# Patient Record
Sex: MALE | Race: BLACK OR AFRICAN AMERICAN | NOT HISPANIC OR LATINO | URBAN - METROPOLITAN AREA
[De-identification: names, ages, dates, MRNs, and addresses within clinical notes are randomized per-mention and may not be internally consistent; named-entity substitution may affect disease eponyms.]

---

## 2020-09-27 ENCOUNTER — EMERGENCY (EMERGENCY)
Facility: HOSPITAL | Age: 25
LOS: 1 days | Discharge: HOME | End: 2020-09-27
Attending: EMERGENCY MEDICINE | Admitting: EMERGENCY MEDICINE
Payer: COMMERCIAL

## 2020-09-27 VITALS
DIASTOLIC BLOOD PRESSURE: 74 MMHG | OXYGEN SATURATION: 98 % | HEART RATE: 70 BPM | SYSTOLIC BLOOD PRESSURE: 123 MMHG | RESPIRATION RATE: 15 BRPM | WEIGHT: 179.9 LBS | TEMPERATURE: 98 F | HEIGHT: 70 IN

## 2020-09-27 DIAGNOSIS — M54.2 CERVICALGIA: ICD-10-CM

## 2020-09-27 PROCEDURE — 99283 EMERGENCY DEPT VISIT LOW MDM: CPT

## 2020-09-27 PROCEDURE — 99282 EMERGENCY DEPT VISIT SF MDM: CPT

## 2020-09-27 NOTE — ED ADULT NURSE NOTE - CHPI ED NUR SYMPTOMS NEG
no loss of consciousness/no sleeping issues/no disorientation/no crying/no dizziness/no headache/no laceration/no acting out behaviors/no back pain/no bruising/no decreased eating/drinking/no fussiness

## 2020-09-27 NOTE — ED PROVIDER NOTE - NSFOLLOWUPINSTRUCTIONS_ED_ALL_ED_FT
Follow up with your primary physician for a post hospital visit within 48 hours, taking all results from the ER to be reviewed.   Apply a heating pad as directed to help relax the muscles. In addition if needed you can take Motrin 600 mg 1 tab every 8 hours. If any weakness to the upper extremities, any worsening, concerning  or new signs or symptoms  return to the ER Follow up with your primary physician for a post hospital visit within 48 hours, taking all results from the ER to be reviewed.   Apply a heating pad as directed to help relax the muscles. In addition if needed for pain you can take Motrin 600 mg 1 tab every 8 hours. If any weakness to the upper extremities, any worsening, concerning  or new signs or symptoms  return to the ER

## 2020-09-27 NOTE — ED PROVIDER NOTE - PATIENT PORTAL LINK FT
You can access the FollowMyHealth Patient Portal offered by [facility] by registering at the following website: [email or web address]. By joining [employer]’s FollowMyHealth portal, you will also be able to view your health information using other applications (apps) compatible with our system.
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
## 2020-12-16 ENCOUNTER — EMERGENCY (EMERGENCY)
Facility: HOSPITAL | Age: 25
LOS: 1 days | Discharge: HOME | End: 2020-12-16
Attending: INTERNAL MEDICINE | Admitting: INTERNAL MEDICINE
Payer: MEDICARE

## 2020-12-16 VITALS
HEIGHT: 70 IN | WEIGHT: 179.9 LBS | OXYGEN SATURATION: 99 % | HEART RATE: 82 BPM | SYSTOLIC BLOOD PRESSURE: 116 MMHG | RESPIRATION RATE: 16 BRPM | DIASTOLIC BLOOD PRESSURE: 76 MMHG | TEMPERATURE: 98 F

## 2020-12-16 LAB — SARS-COV-2 RNA SPEC QL NAA+PROBE: SIGNIFICANT CHANGE UP

## 2020-12-16 PROCEDURE — U0003: CPT

## 2020-12-16 PROCEDURE — 99283 EMERGENCY DEPT VISIT LOW MDM: CPT

## 2020-12-16 NOTE — ED PROVIDER NOTE - PATIENT PORTAL LINK FT
You can access the FollowMyHealth Patient Portal offered by [facility] by registering at the following website: [email or web address]. By joining [employer]’s FollowMyHealth portal, you will also be able to view your health information using other applications (apps) compatible with our system.

## 2021-03-29 ENCOUNTER — EMERGENCY (EMERGENCY)
Facility: HOSPITAL | Age: 26
LOS: 1 days | Discharge: HOME | End: 2021-03-29
Attending: EMERGENCY MEDICINE | Admitting: EMERGENCY MEDICINE
Payer: COMMERCIAL

## 2021-03-29 VITALS
WEIGHT: 179.9 LBS | HEART RATE: 77 BPM | SYSTOLIC BLOOD PRESSURE: 137 MMHG | TEMPERATURE: 98 F | OXYGEN SATURATION: 97 % | DIASTOLIC BLOOD PRESSURE: 93 MMHG | HEIGHT: 70 IN | RESPIRATION RATE: 16 BRPM

## 2021-03-29 PROCEDURE — 99282 EMERGENCY DEPT VISIT SF MDM: CPT

## 2021-03-29 PROCEDURE — 99284 EMERGENCY DEPT VISIT MOD MDM: CPT

## 2021-03-29 NOTE — ED PROVIDER NOTE - PATIENT PORTAL LINK FT
You can access the FollowMyHealth Patient Portal offered by [facility] by registering at the following website: [email or web address]. By joining [employer]’s FollowMyHealth portal, you will also be able to view your health information using other applications (apps) compatible with our system.

## 2021-03-29 NOTE — ED PROVIDER NOTE - NSFOLLOWUPINSTRUCTIONS_ED_ALL_ED_FT
ANIMAL BITE - AfterCare(R) Instructions(ER/ED)           Animal Bite    WHAT YOU NEED TO KNOW:    Animal bite injuries range from shallow cuts to deep, life-threatening wounds. An animal can cut or puncture the skin when it bites. Your skin may be torn from your body. Your skin may swell or bruise even if the bite does not break the skin. Animal bites occur more often on the hands, arms, legs, and face. Bites from dogs and cats are the most common injuries.    DISCHARGE INSTRUCTIONS:    Return to the emergency department if:   •You have a fever.      •Your wound is red, swollen, and draining pus.      •You see red streaks on the skin around the wound.      •You can no longer move the bitten area.      •Your heartbeat and breathing are much faster than usual.      •You feel dizzy and confused.      Contact your healthcare provider if:   •Your pain does not get better, even after you take pain medicine.      •You have nightmares or flashbacks about the animal bite.       •You have questions or concerns about your condition or care.      Medicines: You may need any of the following:   •Antibiotics prevent or treat a bacterial infection.      •Prescription pain medicine may be given. Ask how to take this medicine safely.      •A tetanus vaccine may be needed to prevent tetanus. Tetanus is a life-threatening bacterial infection that affects the nerves and muscles. The bacteria can be spread through animal bites.       •A rabies vaccine may be needed to prevent rabies. Rabies is a life-threatening viral infection. The virus can be spread through animal bites.      •Take your medicine as directed. Contact your healthcare provider if you think your medicine is not helping or if you have side effects. Tell him of her if you are allergic to any medicine. Keep a list of the medicines, vitamins, and herbs you take. Include the amounts, and when and why you take them. Bring the list or the pill bottles to follow-up visits. Carry your medicine list with you in case of an emergency.      Follow up with your healthcare provider in 1 to 2 days: You may need to return to have your stitches removed. Write down your questions so you remember to ask them during your visits.    Self-care:   •Apply antibiotic ointment as directed. This helps prevent infection in minor skin wounds. It is available without a doctor's order.      •Keep the wound clean and covered. Wash the wound every day with soap and water or germ-killing cleanser. Ask your healthcare provider about the kinds of bandages to use.       •Apply ice on your wound. Ice helps decrease swelling and pain. Ice may also help prevent tissue damage. Use an ice pack, or put crushed ice in a plastic bag. Cover it with a towel and place it on your wound for 15 to 20 minutes every hour or as directed.      •Elevate the wound area. Raise your wound above the level of your heart as often as you can. This will help decrease swelling and pain. Prop your wound on pillows or blankets to keep it elevated comfortably.       Prevent another animal bite:   •Learn to recognize the signs of a scared or angry pet. Avoid quick, sudden movements.      •Do not step between animals that are fighting.      •Do not leave a pet alone with a young child.      •Do not disturb an animal while it eats, sleeps, or cares for its young.      •Do not approach an animal you do not know, especially one that is tied up or caged.      •Stay away from animals that seem sick or act strangely.      •Do not feed or capture wild animals.          © Copyright [employer] 2021           back to top                          © Copyright [employer] 2021

## 2021-03-29 NOTE — ED ADULT NURSE NOTE - OBJECTIVE STATEMENT
Patient presents to ED with complaints of dog bite while in [place] on his way to work. Patient was bit on 2nd and 4th digits of left hand. Bite cleaned with peroxide and betadine antiseptic. Patient denies pain/discomfort at this time. Patient does not want to be treated with rabies vaccination/immunoglobulin at this time. Patient denies other injuries or complaints at this time. Patient states animal appeared well, unknown if animal immunizations are UTD.

## 2021-03-29 NOTE — ED PROVIDER NOTE - CLINICAL SUMMARY MEDICAL DECISION MAKING FREE TEXT BOX
Patient bitten by dog . sustained lacerations to lateral aspects of both 2nd and 4th fingers. Has been soaked with peroxide/betadine. Augmentin ordered.  Refused rabies. Is going to ask neighbor regarding rabies status

## 2021-12-20 ENCOUNTER — EMERGENCY (EMERGENCY)
Facility: HOSPITAL | Age: 26
LOS: 1 days | Discharge: HOME | End: 2021-12-20
Attending: EMERGENCY MEDICINE | Admitting: EMERGENCY MEDICINE
Payer: COMMERCIAL

## 2021-12-20 VITALS
HEART RATE: 92 BPM | RESPIRATION RATE: 16 BRPM | WEIGHT: 190.04 LBS | TEMPERATURE: 100 F | DIASTOLIC BLOOD PRESSURE: 81 MMHG | OXYGEN SATURATION: 99 % | HEIGHT: 70 IN | SYSTOLIC BLOOD PRESSURE: 130 MMHG
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
## 2021-12-20 DIAGNOSIS — S61.211A LACERATION WITHOUT FOREIGN BODY OF LEFT INDEX FINGER WITHOUT DAMAGE TO NAIL, INITIAL ENCOUNTER: ICD-10-CM

## 2021-12-20 DIAGNOSIS — S61.215A LACERATION WITHOUT FOREIGN BODY OF LEFT RING FINGER WITHOUT DAMAGE TO NAIL, INITIAL ENCOUNTER: ICD-10-CM

## 2021-12-20 DIAGNOSIS — S61.251A OPEN BITE OF LEFT INDEX FINGER WITHOUT DAMAGE TO NAIL, INITIAL ENCOUNTER: ICD-10-CM
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
## 2021-12-20 DIAGNOSIS — S61.255A OPEN BITE OF LEFT RING FINGER WITHOUT DAMAGE TO NAIL, INITIAL ENCOUNTER: ICD-10-CM

## 2021-12-20 LAB — SARS-COV-2 RNA SPEC QL NAA+PROBE: DETECTED

## 2021-12-20 PROCEDURE — 99283 EMERGENCY DEPT VISIT LOW MDM: CPT

## 2021-12-20 PROCEDURE — 99284 EMERGENCY DEPT VISIT MOD MDM: CPT

## 2021-12-20 PROCEDURE — U0005: CPT

## 2021-12-20 PROCEDURE — U0003: CPT

## 2021-12-20 NOTE — ED PROVIDER NOTE - CLINICAL SUMMARY MEDICAL DECISION MAKING FREE TEXT BOX
25 yo male with no medical history, complaining of 2 days of malaise, generalized aches, Temp of 99.7 at home, intermittent sore throat, HA (resolved prior to presentation in ED). Patient denies any cough, SOB, chest pain, rashes, or any other complaint. Patient requesting COVID19 testing.    No exudates in oropharnyx, no cervical lymphadenopathy, Lungs clear to ascultation b/l all fields.     COVID19 PCR ordered.

## 2021-12-20 NOTE — ED PROVIDER NOTE - OBJECTIVE STATEMENT
malaise, generalized aches, 99.7, sore throat, HA (resolved) 25 yo male with no medical history, complaining of 2 days of malaise, generalized aches, Temp of 99.7 at home, intermittent sore throat, HA (resolved prior to presentation in ED). Patient denies any cough, SOB, chest pain, rashes, or any other complaint.
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
## 2021-12-20 NOTE — ED PROVIDER NOTE - PATIENT PORTAL LINK FT
You can access the FollowMyHealth Patient Portal offered by [facility] by registering at the following website: [email or web address]. By joining [employer]’s FollowMyHealth portal, you will also be able to view your health information using other applications (apps) compatible with our system.

## 2021-12-20 NOTE — ED PROVIDER NOTE - NSFOLLOWUPINSTRUCTIONS_ED_ALL_ED_FT
1You were seen in the ED and underwent testing for the novel coronavirus (COVID-19). The results are not back yet and you will be contacted.      Viral Syndrome    WHAT YOU NEED TO KNOW:    Viral syndrome is a term used for symptoms of an infection caused by a virus. Viruses are spread easily from person to person through the air and on shared items.    DISCHARGE INSTRUCTIONS:    Call your local emergency number (911 in the [place]) or have someone else call if:   •You have a seizure.      •You cannot be woken.      •You have chest pain or trouble breathing.      Return to the emergency department if:   •You have a stiff neck, a bad headache, and sensitivity to light.      •You feel weak, dizzy, or confused.      •You stop urinating or urinate a lot less than usual.      •You cough up blood or thick yellow or green mucus.      •You have severe abdominal pain or your abdomen is larger than usual.      Call your doctor if:   •Your symptoms do not get better with treatment or get worse after 3 days.      •You have a rash or ear pain.      •You have burning when you urinate.      •You have questions or concerns about your condition or care.      Medicines: You may need any of the following:   •Acetaminophen decreases pain and fever. It is available without a doctor's order. Ask how much to take and how often to take it. Follow directions. Read the labels of all other medicines you are using to see if they also contain acetaminophen, or ask your doctor or pharmacist. Acetaminophen can cause liver damage if not taken correctly. Do not use more than 4 grams (4,000 milligrams) total of acetaminophen in one day.       •NSAIDs, such as ibuprofen, help decrease swelling, pain, and fever. NSAIDs can cause stomach bleeding or kidney problems in certain people. If you take blood thinner medicine, always ask your healthcare provider if NSAIDs are safe for you. Always read the medicine label and follow directions.      •Cold medicine helps decrease swelling, control a cough, and relieve chest or nasal congestion.       •Saline nasal spray helps decrease nasal congestion.       •Take your medicine as directed. Contact your healthcare provider if you think your medicine is not helping or if you have side effects. Tell him of her if you are allergic to any medicine. Keep a list of the medicines, vitamins, and herbs you take. Include the amounts, and when and why you take them. Bring the list or the pill bottles to follow-up visits. Carry your medicine list with you in case of an emergency.      Manage your symptoms:   •Drink liquids as directed to prevent dehydration. Ask how much liquid to drink each day and which liquids are best for you. Ask if you should drink an oral rehydration solution (ORS). An ORS has the right amounts of water, salts, and sugar you need to replace body fluids. This may help prevent dehydration caused by vomiting or diarrhea. Do not drink liquids with caffeine. Liquids with caffeine can make dehydration worse.      •Get plenty of rest to help your body heal. Take naps throughout the day. Ask your healthcare provider when you can return to work and your normal activities.      •Use a cool mist humidifier to help you breathe easier. Ask your healthcare provider how to use a cool mist humidifier.      •Eat honey or use cough drops for a sore throat. Cough drops are available without a doctor's order. Follow directions for taking cough drops.      •Do not smoke or be close to anyone who is smoking. Nicotine and other chemicals in cigarettes and cigars can cause lung damage. Smoking can also delay healing. Ask your healthcare provider for information if you currently smoke and need help to quit. E-cigarettes or smokeless tobacco still contain nicotine. Talk to your healthcare provider before you use these products.      Prevent the spread of germs:          •Wash your hands often. Wash your hands several times each day. Wash after you use the bathroom, change a child's diaper, and before you prepare or eat food. Use soap and water every time. Rub your soapy hands together, lacing your fingers. Wash the front and back of your hands, and in between your fingers. Use the fingers of one hand to scrub under the fingernails of the other hand. Wash for at least 20 seconds. Rinse with warm, running water for several seconds. Then dry your hands with a clean towel or paper towel. Use hand  that contains alcohol if soap and water are not available. Do not touch your eyes, nose, or mouth without washing your hands first.  Handwashing           •Cover a sneeze or cough. Use a tissue that covers your mouth and nose. Throw the tissue away in a trash can right away. Use the bend of your arm if a tissue is not available. Wash your hands well with soap and water or use a hand .      •Stay away from others while you are sick. Avoid crowds as much as possible.      •Ask about vaccines you may need. Talk to your healthcare provider about your vaccine history. He or she will tell you which vaccines you need, and when to get them.?Get the influenza (flu) vaccine as soon as recommended each year. The flu vaccine is available starting in September or October. Flu viruses change, so it is important to get a flu vaccine every year.      ?Get the pneumonia vaccine if recommended. This vaccine is usually recommended every 5 years. Your provider will tell you when to get this vaccine, if needed.        Follow up with your doctor as directed: Write down your questions so you remember to ask them during your visits.

## 2021-12-20 NOTE — ED PROVIDER NOTE - ENMT, MLM
Airway patent, Nasal mucosa clear. Mouth with normal mucosa. Throat has no vesicles, no oropharyngeal exudates and uvula is midline. TMs normal

## 2021-12-23 NOTE — ED PROVIDER NOTE - NEURO NEGATIVE STATEMENT, MLM
CBC/CMP
no loss of consciousness, no gait abnormality, no headache, no sensory deficits, and no weakness.

## 2022-02-15 ENCOUNTER — NON-APPOINTMENT (OUTPATIENT)
Age: 27
End: 2022-02-15

## 2022-02-15 ENCOUNTER — APPOINTMENT (OUTPATIENT)
Dept: ORTHOPEDIC SURGERY | Facility: CLINIC | Age: 27
End: 2022-02-15
Payer: COMMERCIAL

## 2022-02-15 PROBLEM — Z00.00 ENCOUNTER FOR PREVENTIVE HEALTH EXAMINATION: Status: ACTIVE | Noted: 2022-02-15

## 2022-02-15 PROCEDURE — 99203 OFFICE O/P NEW LOW 30 MIN: CPT

## 2022-03-01 ENCOUNTER — APPOINTMENT (OUTPATIENT)
Dept: ORTHOPEDIC SURGERY | Facility: CLINIC | Age: 27
End: 2022-03-01
Payer: COMMERCIAL

## 2022-03-01 PROCEDURE — 99214 OFFICE O/P EST MOD 30 MIN: CPT

## 2022-03-01 NOTE — DISCUSSION/SUMMARY
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 We discussed treatment options at length both surgical and nonsurgical. We discussed nonsurgical treatment with activity modification and physical therapy with risk of further instability episodes of further damage to cartilage and meniscus. We discussed surgical treatment. Right knee arthroscopy, anterior cruciate ligament reconstruction, partial lateral meniscectomy versus repair, and patella chondroplasty. Given his age and activity level I would recommend patella tendon autograft. Risks benefits alternatives discussed including but not limited to risks such as bleeding infection nerve and vessel injury continued pain stiffness need for future surgery medical complications such as DVT or PE and anesthesia complications. All questions were answered and he will schedule at his convenience

## 2022-03-01 NOTE — HISTORY OF PRESENT ILLNESS
[de-identified] : 26-year-old male presents complaining of left knee pain for 2 weeks. His playing basketball he sustained an injury for the pop in his knee with immediate swelling. He reports knee instability. He was evaluated by Dr. Bravo who sent him for an MRI at [facility]. He states today for further evaluation \par

## 2022-03-01 NOTE — PHYSICAL EXAM
[de-identified] : left knee exam\par \par Skin: Clean, dry, intact\par Inspection: No obvious malalignment, no masses, + swelling, + effusion.\par Tenderness: no MJLT. ++ LJLT. No tenderness over the medial and lateral patella facets. No ttp medial/lateral epicondyle, patella tendon, tibial tubercle, pes anserinus, or proximal fibula.\par ROM: 0 to 130° no pain with deep flexion in both knees\par Stability: Stable to varus, valgus, laxity with lachman testing. +anterior, neg posterior drawer.\par Additional tests: Negative McMurrays test, Negative patellar grind test. \par Strength: 4/5 Q/H/TA/GS/EHL, no atrophy\par Neuro: In tact to light touch throughout in dp/sp/tib/viv/saph nerve districutions, DTR's normal\par Pulses: 2+ DP/PT pulses. [de-identified] : Radiographs and MRI obtained previously were reviewed. Joint spaces are well-maintained. There is a full-thickness tear of the anterior cruciate ligament. There is a bucket-handle tear of the lateral meniscus. There is chondral loss in the lateral patella facet and a large effusion

## 2022-03-16 ENCOUNTER — OUTPATIENT (OUTPATIENT)
Dept: OUTPATIENT SERVICES | Facility: HOSPITAL | Age: 27
LOS: 1 days | Discharge: HOME | End: 2022-03-16

## 2022-03-16 VITALS
SYSTOLIC BLOOD PRESSURE: 120 MMHG | TEMPERATURE: 98 F | DIASTOLIC BLOOD PRESSURE: 73 MMHG | HEIGHT: 65 IN | HEART RATE: 62 BPM | RESPIRATION RATE: 18 BRPM | WEIGHT: 182.54 LBS | OXYGEN SATURATION: 100 %

## 2022-03-16 DIAGNOSIS — Z01.812 ENCOUNTER FOR PREPROCEDURAL LABORATORY EXAMINATION: ICD-10-CM

## 2022-03-16 DIAGNOSIS — S83.511A SPRAIN OF ANTERIOR CRUCIATE LIGAMENT OF RIGHT KNEE, INITIAL ENCOUNTER: ICD-10-CM

## 2022-03-16 DIAGNOSIS — Z01.818 ENCOUNTER FOR OTHER PREPROCEDURAL EXAMINATION: ICD-10-CM

## 2022-03-16 LAB
ANION GAP SERPL CALC-SCNC: 4 MMOL/L — LOW (ref 5–17)
BUN SERPL-MCNC: 14 MG/DL — SIGNIFICANT CHANGE UP (ref 7–23)
CALCIUM SERPL-MCNC: 9.3 MG/DL — SIGNIFICANT CHANGE UP (ref 8.5–10.1)
CHLORIDE SERPL-SCNC: 104 MMOL/L — SIGNIFICANT CHANGE UP (ref 96–108)
CO2 SERPL-SCNC: 30 MMOL/L — SIGNIFICANT CHANGE UP (ref 22–31)
CREAT SERPL-MCNC: 0.99 MG/DL — SIGNIFICANT CHANGE UP (ref 0.5–1.3)
EGFR: 106 ML/MIN/1.73M2 — SIGNIFICANT CHANGE UP
GLUCOSE SERPL-MCNC: 90 MG/DL — SIGNIFICANT CHANGE UP (ref 70–99)
HCT VFR BLD CALC: 42.5 % — SIGNIFICANT CHANGE UP (ref 39–50)
HGB BLD-MCNC: 14 G/DL — SIGNIFICANT CHANGE UP (ref 13–17)
MCHC RBC-ENTMCNC: 27.8 PG — SIGNIFICANT CHANGE UP (ref 27–34)
MCHC RBC-ENTMCNC: 32.9 G/DL — SIGNIFICANT CHANGE UP (ref 32–36)
MCV RBC AUTO: 84.3 FL — SIGNIFICANT CHANGE UP (ref 80–100)
NRBC # BLD: 0 /100 WBCS — SIGNIFICANT CHANGE UP (ref 0–0)
PLATELET # BLD AUTO: 260 K/UL — SIGNIFICANT CHANGE UP (ref 150–400)
POTASSIUM SERPL-MCNC: 3.7 MMOL/L — SIGNIFICANT CHANGE UP (ref 3.5–5.3)
POTASSIUM SERPL-SCNC: 3.7 MMOL/L — SIGNIFICANT CHANGE UP (ref 3.5–5.3)
RBC # BLD: 5.04 M/UL — SIGNIFICANT CHANGE UP (ref 4.2–5.8)
RBC # FLD: 15.1 % — HIGH (ref 10.3–14.5)
SODIUM SERPL-SCNC: 138 MMOL/L — SIGNIFICANT CHANGE UP (ref 135–145)
WBC # BLD: 6.24 K/UL — SIGNIFICANT CHANGE UP (ref 3.8–10.5)
WBC # FLD AUTO: 6.24 K/UL — SIGNIFICANT CHANGE UP (ref 3.8–10.5)

## 2022-03-16 NOTE — H&P PST ADULT - NSICDXPASTMEDICALHX_GEN_ALL_CORE_FT
PAST MEDICAL HISTORY:  2019 novel coronavirus disease (COVID-19) 12/2021 + covid, home quarantine, no intubation, no hospitalization     PAST MEDICAL HISTORY:  2019 novel coronavirus disease (COVID-19) 12/20/2021 + covid, home quarantine, no intubation, no hospitalization

## 2022-03-16 NOTE — H&P PST ADULT - PROBLEM SELECTOR PLAN 1
right knee arthroscopy, anterior cruciate ligament reconstruction, partial lateral meniscectomy versus repair, and patella chondroplasty

## 2022-03-16 NOTE — H&P PST ADULT - HISTORY OF PRESENT ILLNESS
This is a  26 y/o male twisted right knee in a basketball game 2/2022, c/o right knee pain, seen orthopedist, recommended surgery, He presents today for right knee arthroscopy ACL reconstruction, meniscectomy repair  and patella chondroplasty 3/21/22  covid swab to be done 3/18 at [place]  denies recent travel, sick contact or covid infections

## 2022-03-16 NOTE — H&P PST ADULT - NSANTHOSAYNRD_GEN_A_CORE
No. EKTA screening performed.  STOP BANG Legend: 0-2 = LOW Risk; 3-4 = INTERMEDIATE Risk; 5-8 = HIGH Risk

## 2022-03-20 ENCOUNTER — TRANSCRIPTION ENCOUNTER (OUTPATIENT)
Age: 27
End: 2022-03-20

## 2022-03-21 ENCOUNTER — APPOINTMENT (OUTPATIENT)
Dept: ORTHOPEDIC SURGERY | Facility: HOSPITAL | Age: 27
End: 2022-03-21

## 2022-03-21 ENCOUNTER — OUTPATIENT (OUTPATIENT)
Dept: OUTPATIENT SERVICES | Facility: HOSPITAL | Age: 27
LOS: 1 days | Discharge: HOME | End: 2022-03-21
Payer: COMMERCIAL

## 2022-03-21 VITALS
OXYGEN SATURATION: 99 % | RESPIRATION RATE: 16 BRPM | TEMPERATURE: 98 F | SYSTOLIC BLOOD PRESSURE: 147 MMHG | HEART RATE: 81 BPM | DIASTOLIC BLOOD PRESSURE: 88 MMHG

## 2022-03-21 VITALS
RESPIRATION RATE: 16 BRPM | DIASTOLIC BLOOD PRESSURE: 75 MMHG | HEART RATE: 64 BPM | SYSTOLIC BLOOD PRESSURE: 114 MMHG | TEMPERATURE: 98 F | WEIGHT: 179.9 LBS | HEIGHT: 70 IN | OXYGEN SATURATION: 99 %

## 2022-03-21 PROCEDURE — 29888 ARTHRS AID ACL RPR/AGMNTJ: CPT | Mod: RT

## 2022-03-21 PROCEDURE — 29881 ARTHRS KNE SRG MNISECTMY M/L: CPT | Mod: RT

## 2022-03-21 DEVICE — IMPLANTABLE DEVICE: Type: IMPLANTABLE DEVICE | Site: RIGHT | Status: FUNCTIONAL

## 2022-03-21 DEVICE — SCREW INTRFRNCE SHEATHED 8X20MM: Type: IMPLANTABLE DEVICE | Site: RIGHT | Status: FUNCTIONAL

## 2022-03-21 DEVICE — ANCHOR SUT FIBERSTITCH 2-0 CRVD: Type: IMPLANTABLE DEVICE | Site: RIGHT | Status: FUNCTIONAL

## 2022-03-21 RX ORDER — ONDANSETRON 8 MG/1
1 TABLET, FILM COATED ORAL
Qty: 21 | Refills: 0
Start: 2022-03-21 | End: 2022-03-27

## 2022-03-21 RX ORDER — OXYCODONE HYDROCHLORIDE 5 MG/1
1 TABLET ORAL
Qty: 28 | Refills: 0
Start: 2022-03-21 | End: 2022-03-27

## 2022-03-21 RX ORDER — ONDANSETRON 8 MG/1
4 TABLET, FILM COATED ORAL ONCE
Refills: 0 | Status: DISCONTINUED | OUTPATIENT
Start: 2022-03-21 | End: 2022-03-21

## 2022-03-21 RX ORDER — FENTANYL CITRATE 50 UG/ML
25 INJECTION INTRAVENOUS
Refills: 0 | Status: DISCONTINUED | OUTPATIENT
Start: 2022-03-21 | End: 2022-03-21

## 2022-03-21 RX ORDER — SODIUM CHLORIDE 9 MG/ML
1000 INJECTION, SOLUTION INTRAVENOUS
Refills: 0 | Status: DISCONTINUED | OUTPATIENT
Start: 2022-03-21 | End: 2022-03-21

## 2022-03-21 RX ORDER — ASPIRIN/CALCIUM CARB/MAGNESIUM 324 MG
1 TABLET ORAL
Qty: 14 | Refills: 0
Start: 2022-03-21 | End: 2022-04-03

## 2022-03-21 RX ORDER — DOCUSATE SODIUM 100 MG
1 CAPSULE ORAL
Qty: 21 | Refills: 0
Start: 2022-03-21 | End: 2022-03-27

## 2022-03-21 RX ORDER — FENTANYL CITRATE 50 UG/ML
50 INJECTION INTRAVENOUS
Refills: 0 | Status: DISCONTINUED | OUTPATIENT
Start: 2022-03-21 | End: 2022-03-21

## 2022-03-21 RX ORDER — SODIUM CHLORIDE 9 MG/ML
1000 INJECTION, SOLUTION INTRAVENOUS
Refills: 0 | Status: DISCONTINUED | OUTPATIENT
Start: 2022-03-21 | End: 2022-04-04

## 2022-03-21 RX ORDER — SODIUM CHLORIDE 9 MG/ML
3 INJECTION INTRAMUSCULAR; INTRAVENOUS; SUBCUTANEOUS EVERY 8 HOURS
Refills: 0 | Status: DISCONTINUED | OUTPATIENT
Start: 2022-03-21 | End: 2022-03-21

## 2022-03-21 RX ADMIN — FENTANYL CITRATE 50 MICROGRAM(S): 50 INJECTION INTRAVENOUS at 12:03

## 2022-03-21 RX ADMIN — SODIUM CHLORIDE 75 MILLILITER(S): 9 INJECTION, SOLUTION INTRAVENOUS at 12:04

## 2022-03-21 RX ADMIN — FENTANYL CITRATE 50 MICROGRAM(S): 50 INJECTION INTRAVENOUS at 12:19

## 2022-03-21 RX ADMIN — FENTANYL CITRATE 50 MICROGRAM(S): 50 INJECTION INTRAVENOUS at 12:23

## 2022-03-21 RX ADMIN — FENTANYL CITRATE 50 MICROGRAM(S): 50 INJECTION INTRAVENOUS at 13:07

## 2022-03-21 RX ADMIN — FENTANYL CITRATE 50 MICROGRAM(S): 50 INJECTION INTRAVENOUS at 12:49

## 2022-03-21 NOTE — ASU DISCHARGE PLAN (ADULT/PEDIATRIC) - CARE PROVIDER_API CALL
Rich Tse)  Orthopaedic Surgery  [address], [address]  [place] [address]  Phone: [phone]  Fax: [phone]  Follow Up Time:

## 2022-03-21 NOTE — BRIEF OPERATIVE NOTE - OPERATION/FINDINGS
right knee arthroscopic ACL reconstruction with BTB autograft and partial lateral menisectomy of chronic buckle handle tear

## 2022-03-21 NOTE — ASU PATIENT PROFILE, ADULT - FALL HARM RISK - UNIVERSAL INTERVENTIONS
Bed in lowest position, wheels locked, appropriate side rails in place/Call bell, personal items and telephone in reach/Instruct patient to call for assistance before getting out of bed or chair/Non-slip footwear when patient is out of bed/[place] to call system/Physically safe environment - no spills, clutter or unnecessary equipment/Purposeful Proactive Rounding/Room/bathroom lighting operational, light cord in reach

## 2022-03-21 NOTE — ASU PATIENT PROFILE, ADULT - NSICDXPASTMEDICALHX_GEN_ALL_CORE_FT
PAST MEDICAL HISTORY:  2019 novel coronavirus disease (COVID-19) 12/20/2021 + covid, home quarantine, no intubation, no hospitalization

## 2022-03-21 NOTE — ASU DISCHARGE PLAN (ADULT/PEDIATRIC) - NS MD DC FALL RISK RISK
For information on Fall & Injury Prevention, visit: [email or web address] OR  [email or web address] OR  https://www.cdc.gov/steadi/patient.html

## 2022-03-24 DIAGNOSIS — S83.251A BUCKET-HANDLE TEAR OF LATERAL MENISCUS, CURRENT INJURY, RIGHT KNEE, INITIAL ENCOUNTER: ICD-10-CM
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
## 2022-03-24 DIAGNOSIS — S83.511A SPRAIN OF ANTERIOR CRUCIATE LIGAMENT OF RIGHT KNEE, INITIAL ENCOUNTER: ICD-10-CM
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
## 2022-03-24 DIAGNOSIS — Z86.16 PERSONAL HISTORY OF COVID-19: ICD-10-CM
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
## 2022-03-24 RX ORDER — TRAMADOL HYDROCHLORIDE 50 MG/1
50 TABLET, COATED ORAL
Qty: 60 | Refills: 0 | Status: ACTIVE | COMMUNITY
Start: 2022-03-24 | End: 1900-01-01

## 2022-03-24 RX ORDER — IBUPROFEN 800 MG/1
800 TABLET, FILM COATED ORAL 3 TIMES DAILY
Qty: 60 | Refills: 1 | Status: ACTIVE | COMMUNITY
Start: 2022-03-24 | End: 1900-01-01

## 2022-04-05 ENCOUNTER — APPOINTMENT (OUTPATIENT)
Dept: ORTHOPEDIC SURGERY | Facility: CLINIC | Age: 27
End: 2022-04-05
Payer: COMMERCIAL

## 2022-04-05 PROCEDURE — 99024 POSTOP FOLLOW-UP VISIT: CPT

## 2022-04-05 NOTE — HISTORY OF PRESENT ILLNESS
[de-identified] : R knee [de-identified] : 28yo M s/p Right knee arthroscopy, anterior cruciate ligament reconstruction with bone-patellar tendon-bone autograft and partial lateral meniscectomy, 3/21/22.  He is doing well.  His pain is controlled.  He has started physical therapy. before [de-identified] : Right knee exam\par Incisions are healing well no erythema\par Mild effusion\par Range of motion 0-°100\par Stable anterior drawer and Lachman examination\par calf is soft and nontender\par Able to flex and extend all toes\par Sensation intact throughout\par brisk capillary refill.\par  [de-identified] : 28yo M s/p Right knee arthroscopy, anterior cruciate ligament reconstruction with bone-patellar tendon-bone autograft and partial lateral meniscectomy, 3/21/22. [de-identified] : We reviewed postoperative protocol and restrictions.  Brace unlocked 0 to 90 degrees weightbearing as tolerated in the brace.  Physical therapy.  Follow-up 1 month

## 2022-05-03 PROBLEM — U07.1 COVID-19: Chronic | Status: ACTIVE | Noted: 2022-03-16

## 2022-05-11 ENCOUNTER — APPOINTMENT (OUTPATIENT)
Dept: ORTHOPEDIC SURGERY | Facility: CLINIC | Age: 27
End: 2022-05-11
Payer: COMMERCIAL

## 2022-05-11 PROCEDURE — 99024 POSTOP FOLLOW-UP VISIT: CPT

## 2022-05-11 NOTE — HISTORY OF PRESENT ILLNESS
[de-identified] : R knee [de-identified] : 28yo M s/p Right knee arthroscopy, anterior cruciate ligament reconstruction with bone-patellar tendon-bone autograft and partial lateral meniscectomy, 3/21/22.  He is overall doing well.  His pain is controlled.  His brace was discontinued by his physical therapist was happy with his progress [de-identified] : Right knee exam\par Incisions are healing well no erythema\par Mild effusion\par Range of motion 0-°140\par no Medial joint line tenderness.  No lateral joint line tenderness.  Stable anterior drawer and Lachman examination\par calf is soft and nontender\par Able to flex and extend all toes\par Sensation intact throughout\par brisk capillary refill.\par  [de-identified] : 28yo M s/p Right knee arthroscopy, anterior cruciate ligament reconstruction with bone-patellar tendon-bone autograft and partial lateral meniscectomy, 3/21/22. [de-identified] : We reviewed postoperative protocol and restrictions.  Weightbearing as tolerated continue physical therapy focus on quadriceps and core strengthening.  He will follow-up in 6 weeks.

## 2022-06-22 ENCOUNTER — APPOINTMENT (OUTPATIENT)
Dept: ORTHOPEDIC SURGERY | Facility: CLINIC | Age: 27
End: 2022-06-22
Payer: COMMERCIAL

## 2022-06-22 VITALS — WEIGHT: 175 LBS | HEIGHT: 70 IN | BODY MASS INDEX: 25.05 KG/M2

## 2022-06-22 PROCEDURE — 99213 OFFICE O/P EST LOW 20 MIN: CPT

## 2022-06-22 NOTE — PHYSICAL EXAM
[de-identified] : Right hip exam\par \par Skin: Clean/dry and intact\par Inspection: No obvious deformity, no swelling, no ecchymosis.\par Tenderness:  no tenderness over greater trochanter/glut medius insertion. No tenderness pubic symphysis, pubic tubercle, hip flexors. No ttp ischial tuberosity or buttock. No ttp over the ASIS/Illiac crest.\par ROM: 0-120°. Internal rotation 30 external rotation 70\par Painful ROM: None\par Additional tests: No pain with circumduction negative impingement test at 90° mildly positive impingement test at 60° negative Guido negative [facility]\par Strength: 5/5 hip flexion/ADD/ABD/Q/H/TA/GS/EHL\par Neuro: Sensation in tact to light touch throughout in dp/sp/tib/viv/saph distributions\par Pulses: 2+ DP/PT pulses\par

## 2022-06-22 NOTE — DISCUSSION/SUMMARY
[de-identified] : We reviewed postoperative protocol and restrictions.  He will continue his physical therapy.  He will begin straight line jogging at this point.  We will focus on quadriceps and core strengthening.  He will follow-up in 3 months.

## 2022-06-22 NOTE — HISTORY OF PRESENT ILLNESS
[de-identified] : 26yo M s/p Right knee arthroscopy, anterior cruciate ligament reconstruction with bone-patellar tendon-bone autograft and partial lateral meniscectomy, 3/21/22.  He is overall doing well.  He reports no pain.  He is working with physical therapy.  He is happy with his progress.  He denies any swelling locking catching buckling instability

## 2022-07-06 ENCOUNTER — NON-APPOINTMENT (OUTPATIENT)
Age: 27
End: 2022-07-06

## 2022-09-07 ENCOUNTER — APPOINTMENT (OUTPATIENT)
Dept: ORTHOPEDIC SURGERY | Facility: CLINIC | Age: 27
End: 2022-09-07

## 2022-09-21 ENCOUNTER — APPOINTMENT (OUTPATIENT)
Dept: ORTHOPEDIC SURGERY | Facility: CLINIC | Age: 27
End: 2022-09-21

## 2022-09-21 VITALS — BODY MASS INDEX: 19.33 KG/M2 | HEIGHT: 70 IN | WEIGHT: 135 LBS

## 2022-09-21 DIAGNOSIS — S83.511A SPRAIN OF ANTERIOR CRUCIATE LIGAMENT OF RIGHT KNEE, INITIAL ENCOUNTER: ICD-10-CM

## 2022-09-21 PROCEDURE — 99213 OFFICE O/P EST LOW 20 MIN: CPT | Mod: NC

## 2022-09-21 NOTE — DISCUSSION/SUMMARY
[de-identified] : He is now 6 months postoperative he is able to single-leg hop and single-leg squat.  He is cleared to return to work at this time.  No cutting or twisting activities.  He will follow-up in 3 months for full sports clearance.  All questions were answered.  Work note given

## 2022-09-21 NOTE — HISTORY OF PRESENT ILLNESS
[de-identified] : 28yo M s/p Right knee arthroscopy, anterior cruciate ligament reconstruction with bone-patellar tendon-bone autograft and partial lateral meniscectomy, 3/21/22.  He is overall doing well.  His pain is controlled.  He has stopped his physical therapy.  He denies any numbness or tingling.  He is looking forward to returning to work.

## 2022-09-21 NOTE — PHYSICAL EXAM
[de-identified] : right knee exam\par \par Skin: Clean, dry, intact\par Inspection: No obvious malalignment, no masses, no swelling, no effusion.\par Tenderness: no MJLT. No LJLT. No tenderness over the medial and lateral patella facets. No ttp medial/lateral epicondyle, patella tendon, tibial tubercle, pes anserinus, or proximal fibula.\par ROM: 0 to 130° no pain with deep flexion in both knees\par Stability: Stable to varus, valgus, lachman testing. Negative anterior/posterior drawer.\par Additional tests: Negative McMurrays test, Negative patellar grind test. \par Strength: 5/5 Q/H/TA/GS/EHL, no atrophy\par Neuro: In tact to light touch throughout in dp/sp/tib/viv/saph nerve districutions, DTR's normal\par Pulses: 2+ DP/PT pulses.\par

## 2022-11-08 NOTE — BRIEF OPERATIVE NOTE - CONDITION POST OP
stable Doxycycline Counseling:  Patient counseled regarding possible photosensitivity and increased risk for sunburn.  Patient instructed to avoid sunlight, if possible.  When exposed to sunlight, patients should wear protective clothing, sunglasses, and sunscreen.  The patient was instructed to call the office immediately if the following severe adverse effects occur:  hearing changes, easy bruising/bleeding, severe headache, or vision changes.  The patient verbalized understanding of the proper use and possible adverse effects of doxycycline.  All of the patient's questions and concerns were addressed.

## 2022-12-21 ENCOUNTER — APPOINTMENT (OUTPATIENT)
Dept: ORTHOPEDIC SURGERY | Facility: CLINIC | Age: 27
End: 2022-12-21